# Patient Record
Sex: FEMALE | Race: BLACK OR AFRICAN AMERICAN | Employment: FULL TIME | URBAN - METROPOLITAN AREA
[De-identification: names, ages, dates, MRNs, and addresses within clinical notes are randomized per-mention and may not be internally consistent; named-entity substitution may affect disease eponyms.]

---

## 2020-11-12 DIAGNOSIS — M25.562 LEFT KNEE PAIN, UNSPECIFIED CHRONICITY: Primary | ICD-10-CM

## 2020-11-13 ENCOUNTER — OFFICE VISIT (OUTPATIENT)
Dept: ORTHOPEDIC SURGERY | Age: 60
End: 2020-11-13
Payer: COMMERCIAL

## 2020-11-13 VITALS — WEIGHT: 181 LBS | HEIGHT: 60 IN | BODY MASS INDEX: 35.53 KG/M2

## 2020-11-13 DIAGNOSIS — M25.562 LEFT KNEE PAIN, UNSPECIFIED CHRONICITY: Primary | ICD-10-CM

## 2020-11-13 PROCEDURE — 99203 OFFICE O/P NEW LOW 30 MIN: CPT | Performed by: ORTHOPAEDIC SURGERY

## 2020-11-13 RX ORDER — LANOLIN ALCOHOL/MO/W.PET/CERES
1 CREAM (GRAM) TOPICAL
COMMUNITY

## 2020-11-13 RX ORDER — ROSUVASTATIN CALCIUM 10 MG/1
10 TABLET, COATED ORAL
COMMUNITY

## 2020-11-13 RX ORDER — METFORMIN HYDROCHLORIDE 500 MG/1
TABLET ORAL 2 TIMES DAILY WITH MEALS
COMMUNITY

## 2020-11-13 RX ORDER — TRIAMTERENE AND HYDROCHLOROTHIAZIDE 37.5; 25 MG/1; MG/1
CAPSULE ORAL DAILY
COMMUNITY

## 2020-11-13 RX ORDER — PANTOPRAZOLE SODIUM 40 MG/1
40 TABLET, DELAYED RELEASE ORAL DAILY
COMMUNITY

## 2020-11-13 NOTE — PATIENT INSTRUCTIONS
Knee Arthritis: Care Instructions Your Care Instructions Knee arthritis is a breakdown of the cartilage that cushions your knee joint. When the cartilage wears down, your bones rub against each other. This causes pain and stiffness. Knee arthritis tends to get worse with time. Treatment for knee arthritis involves reducing pain, making the leg muscles stronger, and staying at a healthy body weight. The treatment usually does not improve the health of the cartilage, but it can reduce pain and improve how well your knee works. You can take simple measures to protect your knee joints, ease your pain, and help you stay active. Follow-up care is a key part of your treatment and safety. Be sure to make and go to all appointments, and call your doctor if you are having problems. It's also a good idea to know your test results and keep a list of the medicines you take. How can you care for yourself at home? · Know that knee arthritis will cause more pain on some days than on others. · Stay at a healthy weight. Lose weight if you are overweight. When you stand up, the pressure on your knees from every pound of body weight is multiplied four times. So if you lose 10 pounds, you will reduce the pressure on your knees by 40 pounds. · Talk to your doctor or physical therapist about exercises that will help ease joint pain. ? Stretch to help prevent stiffness and to prevent injury before you exercise. You may enjoy gentle forms of yoga to help keep your knee joints and muscles flexible. ? Walk instead of jog. 
? Ride a bike. This makes your thigh muscles stronger and takes pressure off your knee. ? Wear well-fitting and comfortable shoes. ? Exercise in chest-deep water. This can help you exercise longer with less pain. ? Avoid exercises that include squatting or kneeling. They can put a lot of strain on your knees.  
? Talk to your doctor to make sure that the exercise you do is not making the arthritis worse. · Do not sit for long periods of time. Try to walk once in a while to keep your knee from getting stiff. · Ask your doctor or physical therapist whether shoe inserts may reduce your arthritis pain. · If you can afford it, get new athletic shoes at least every year. This can help reduce the strain on your knees. · Use a device to help you do everyday activities. ? A cane or walking stick can help you keep your balance when you walk. Hold the cane or walking stick in the hand opposite the painful knee. ? If you feel like you may fall when you walk, try using crutches or a front-wheeled walker. These can prevent falls that could cause more damage to your knee. ? A knee brace may help keep your knee stable and prevent pain. ? You also can use other things to make life easier, such as a higher toilet seat and handrails in the bathtub or shower. · Take pain medicines exactly as directed. ? Do not wait until you are in severe pain. You will get better results if you take it sooner. ? If you are not taking a prescription pain medicine, take an over-the-counter medicine such as acetaminophen (Tylenol), ibuprofen (Advil, Motrin), or naproxen (Aleve). Read and follow all instructions on the label. ? Do not take two or more pain medicines at the same time unless the doctor told you to. Many pain medicines have acetaminophen, which is Tylenol. Too much acetaminophen (Tylenol) can be harmful. ? Tell your doctor if you take a blood thinner, have diabetes, or have allergies to shellfish. · Ask your doctor if you might benefit from a shot of steroid medicine into your knee. This may provide pain relief for several months. · Many people take the supplements glucosamine and chondroitin for osteoarthritis. Some people feel they help, but the medical research does not show that they work. Talk to your doctor before you take these supplements. When should you call for help? Call your doctor now or seek immediate medical care if: 
  · You have sudden swelling, warmth, or pain in your knee.  
  · You have knee pain and a fever or rash.  
  · You have such bad pain that you cannot use your knee. Watch closely for changes in your health, and be sure to contact your doctor if you have any problems. Where can you learn more? Go to http://guzman-guillermo.info/ Enter F767 in the search box to learn more about \"Knee Arthritis: Care Instructions. \" Current as of: December 9, 2019               Content Version: 12.6 © 2073-1644 [employer]. Care instructions adapted under license by [employer] (which disclaims liability or warranty for this information). If you have questions about a medical condition or this instruction, always ask your healthcare professional. [address] any warranty or liability for your use of this information.

## 2020-11-13 NOTE — PROGRESS NOTES
Name: Jennifer Pisano    : 1960     Service Dept: [address] [facility]    Patient's Pharmacies:    [facility], [facility]  [address]  [address] [place] [address]  Phone: [phone] Fax: [phone]       Chief Complaint   Patient presents with    Knee Pain        Visit Vitals  Ht 5' (1.524 m)   Wt 181 lb (82.1 kg)   BMI 35.35 kg/m²        No Known Allergies     Current Outpatient Medications   Medication Sig Dispense Refill    metFORMIN (GLUCOPHAGE) 500 mg tablet Take  by mouth two (2) times daily (with meals).  rosuvastatin (CRESTOR) 10 mg tablet Take 10 mg by mouth nightly.  calcium citrate-vitamin d3 (CITRACAL+D) 315 mg-5 mcg (200 unit) tab Take 1 Tab by mouth daily (with breakfast).  pantoprazole (PROTONIX) 40 mg tablet Take 40 mg by mouth daily.  triamterene-hydroCHLOROthiazide (DYAZIDE) 37.5-25 mg per capsule Take  by mouth daily. There is no problem list on file for this patient. No family history on file. Social History     Socioeconomic History    Marital status:      Spouse name: Not on file    Number of children: Not on file    Years of education: Not on file    Highest education level: Not on file        No past surgical history on file. No past medical history on file. I have reviewed and agree with [address] [place] [address] Nw and ROS and intake form in chart and the record. Review of Systems:   Patient is a pleasant appearing individual, appropriately dressed, well hydrated, well nourished, who is alert, appropriately oriented for age, and in no acute distress with a normal gait and normal affect who does not appear to be in any significant pain.        Physical Exam:  Left Knee - Slight decrease range of motion, Grossly neurovascularly intact, Good cap refill, No skin lesions, Mild swelling, No gross instability, Some Quadriceps weakness, Positive medial joint line tenderness, Positive Richard's exam    Right Knee - Normal range of motion, Grossly neurovascularly intact, Good cap refill, No skin lesions, No swelling, No gross instability, No weakness, No medial joint line tenderness, Negative Richard's exam         Encounter Diagnoses     ICD-10-CM ICD-9-CM   1. Left knee pain, unspecified chronicity  M25.562 719.46          HPI:  The patient is here with a chief complaint of left knee pain. She has had a cortisone injection about a month ago and tried ibuprofen with no relief. It has been the same. Nothing has helped. Pain is 8/10. ROS:  10-point review of systems is positive for joint swelling and nighttime pain. X-rays are unremarkable. Assessment/Plan:  1. Left knee pain that is not resolved post cortisone injection. Plan at this point, I would like to go and get an MRI to rule out a meniscus tear. I will see the patient post-MRI. If it shows more arthritis, we may talk to the patient about other options and go from there including knee replacement, but again we would like to get the MRI first.         Return to Office: Follow-up and Dispositions    · Return for POST MRI. Scribed by Michael Simmons as dictated by Marj Nicolas. Javi Herrera MD.    Documentation True and Accepted Adolfo Herrera MD

## 2020-11-17 DIAGNOSIS — M25.562 LEFT KNEE PAIN, UNSPECIFIED CHRONICITY: ICD-10-CM

## 2020-11-30 ENCOUNTER — TELEPHONE (OUTPATIENT)
Dept: ORTHOPEDIC SURGERY | Age: 60
End: 2020-11-30

## 2020-11-30 DIAGNOSIS — M25.562 LEFT KNEE PAIN, UNSPECIFIED CHRONICITY: Primary | ICD-10-CM

## 2020-11-30 RX ORDER — DIAZEPAM 2 MG/1
2 TABLET ORAL AS NEEDED
Qty: 2 TAB | Refills: 0 | Status: SHIPPED | OUTPATIENT
Start: 2020-11-30

## 2020-11-30 NOTE — TELEPHONE ENCOUNTER
SHE IS GOING IN THIS Wednesday TO HAVE MRI DONE ON LT KNEE AND WOULD LIKE SOMETHING TO KEEP HER CALM  DURING MRI

## 2020-12-02 ENCOUNTER — HOSPITAL ENCOUNTER (OUTPATIENT)
Age: 60
Discharge: HOME | End: 2020-12-02
Payer: COMMERCIAL

## 2020-12-02 DIAGNOSIS — M25.562 LEFT KNEE PAIN, UNSPECIFIED CHRONICITY: ICD-10-CM

## 2020-12-02 PROCEDURE — 73721 MRI JNT OF LWR EXTRE W/O DYE: CPT

## 2020-12-04 ENCOUNTER — OFFICE VISIT (OUTPATIENT)
Dept: ORTHOPEDIC SURGERY | Age: 60
End: 2020-12-04
Payer: COMMERCIAL

## 2020-12-04 DIAGNOSIS — M25.562 LEFT KNEE PAIN, UNSPECIFIED CHRONICITY: Primary | ICD-10-CM

## 2020-12-04 DIAGNOSIS — M25.562 LEFT KNEE PAIN, UNSPECIFIED CHRONICITY: ICD-10-CM

## 2020-12-04 DIAGNOSIS — S83.222A PERIPHERAL TEAR OF MEDIAL MENISCUS OF LEFT KNEE AS CURRENT INJURY, INITIAL ENCOUNTER: ICD-10-CM

## 2020-12-04 PROCEDURE — 99214 OFFICE O/P EST MOD 30 MIN: CPT | Performed by: ORTHOPAEDIC SURGERY

## 2020-12-04 NOTE — PROGRESS NOTES
Name: Austin Duran    : 1960     Service Dept: [address] [facility]    Patient's Pharmacies:    Josh [facility]ellank[facility] [address] [facility], [address] [place] [address]. [address]  [facility] [[email or web address]][address] [place] [address]  Phone: [phone] Fax: [phone]       Chief Complaint   Patient presents with    Knee Pain        There were no vitals taken for this visit. No Known Allergies   Current Outpatient Medications   Medication Sig Dispense Refill    diazePAM (VALIUM) 2 mg tablet Take 1 Tab by mouth as needed for Anxiety (take 1 tab prior to exam). Max Daily Amount: 192 mg. 2 Tab 0    metFORMIN (GLUCOPHAGE) 500 mg tablet Take  by mouth two (2) times daily (with meals).  rosuvastatin (CRESTOR) 10 mg tablet Take 10 mg by mouth nightly.  calcium citrate-vitamin d3 (CITRACAL+D) 315 mg-5 mcg (200 unit) tab Take 1 Tab by mouth daily (with breakfast).  pantoprazole (PROTONIX) 40 mg tablet Take 40 mg by mouth daily.  triamterene-hydroCHLOROthiazide (DYAZIDE) 37.5-25 mg per capsule Take  by mouth daily. There is no problem list on file for this patient. Family History   Problem Relation Age of Onset    Cancer Mother       Social History     Socioeconomic History    Marital status:      Spouse name: Not on file    Number of children: Not on file    Years of education: Not on file    Highest education level: Not on file   Tobacco Use    Smoking status: Former Smoker    Smokeless tobacco: Never Used   Substance and Sexual Activity    Alcohol use: Not Currently      History reviewed. No pertinent surgical history. Past Medical History:   Diagnosis Date    Acid reflux     Hypertension         I have reviewed and agree with PFSH and ROS and intake form in chart and the record.      Review of Systems:   Patient is a pleasant appearing individual, appropriately dressed, well hydrated, well nourished, who is alert, appropriately oriented for age, and in no acute distress with a normal gait and normal affect who does not appear to be in any significant pain. Physical Exam:  Left Knee - Slight decrease range of motion, Grossly neurovascularly intact, Good cap refill, No skin lesions, Mild swelling, No gross instability, Some Quadriceps weakness, Positive medial joint line tenderness, Positive Richard's exam    Right Knee - Normal range of motion, Grossly neurovascularly intact, Good cap refill, No skin lesions, No swelling, No gross instability, No weakness, No medial joint line tenderness, Negative Richard's exam.     Encounter Diagnoses     ICD-10-CM ICD-9-CM   1. Left knee pain, unspecified chronicity  M25.562 719.46   2. Peripheral tear of medial meniscus of left knee as current injury, initial encounter  S83.222A 836.0       HPI:  The patient is here with a chief complaint of left knee pain, sharp throbbing pain. It is a lot better. Nothing has helped. Using it makes it worse. Pain is 6/10. MRI is positive for medial meniscus tear with a flipped fragment and some arthritic changes. ROS:  10-point review of systems is positive for joint swelling and nighttime pain. X-rays are unremarkable. Assessment/Plan:  Plan at this point would be for left knee arthroscopy, meniscectomy, and synovectomy. We are going to proceed once she gets set up for surgery in January with basic blood work and EKG. No medical clearance required. Outpatient surgery. Return to Office: Follow-up and Dispositions    · Return for [place] [address]. Scribed by Theodora Gosselin as dictated by [facility] ROSARIO Hernandez Ala, MD.  Documentation True and Accepted Adolfo ROSARIO Hernandez Ala, MD

## 2020-12-04 NOTE — PATIENT INSTRUCTIONS
Knee Pain or Injury: Care Instructions  Your Care Instructions     Injuries are a common cause of knee problems. Sudden (acute) injuries may be caused by a direct blow to the knee. They can also be caused by abnormal twisting, bending, or falling on the knee. Pain, bruising, or swelling may be severe, and may start within minutes of the injury. Overuse is another cause of knee pain. Other causes are climbing stairs, kneeling, and other activities that use the knee. Everyday wear and tear, especially as you get older, also can cause knee pain. Rest, along with home treatment, often relieves pain and allows your knee to heal. If you have a serious knee injury, you may need tests and treatment. Follow-up care is a key part of your treatment and safety. Be sure to make and go to all appointments, and call your doctor if you are having problems. It's also a good idea to know your test results and keep a list of the medicines you take. How can you care for yourself at home? · Be safe with medicines. Read and follow all instructions on the label. ? If the doctor gave you a prescription medicine for pain, take it as prescribed. ? If you are not taking a prescription pain medicine, ask your doctor if you can take an over-the-counter medicine. · Rest and protect your knee. Take a break from any activity that may cause pain. · Put ice or a cold pack on your knee for 10 to 20 minutes at a time. Put a thin cloth between the ice and your skin. · Prop up a sore knee on a pillow when you ice it or anytime you sit or lie down for the next 3 days. Try to keep it above the level of your heart. This will help reduce swelling. · If your knee is not swollen, you can put moist heat, a heating pad, or a warm cloth on your knee. · If your doctor recommends an elastic bandage, sleeve, or other type of support for your knee, wear it as directed.   · Follow your doctor's instructions about how much weight you can put on your leg. Use a cane, crutches, or a walker as instructed. · Follow your doctor's instructions about activity during your healing process. If you can do mild exercise, slowly increase your activity. · Reach and stay at a healthy weight. Extra weight can strain the joints, especially the knees and hips, and make the pain worse. Losing even a few pounds may help. When should you call for help? Call 911 anytime you think you may need emergency care. For example, call if:    · You have symptoms of a blood clot in your lung (called a pulmonary embolism). These may include:  ? Sudden chest pain. ? Trouble breathing. ? Coughing up blood. Call your doctor now or seek immediate medical care if:    · You have severe or increasing pain.     · Your leg or foot turns cold or changes color.     · You cannot stand or put weight on your knee.     · Your knee looks twisted or bent out of shape.     · You cannot move your knee.     · You have signs of infection, such as:  ? Increased pain, swelling, warmth, or redness. ? Red streaks leading from the knee. ? Pus draining from a place on your knee. ? A fever.     · You have signs of a blood clot in your leg (called a deep vein thrombosis), such as:  ? Pain in your calf, back of the knee, thigh, or groin. ? Redness and swelling in your leg or groin. Watch closely for changes in your health, and be sure to contact your doctor if:    · You have tingling, weakness, or numbness in your knee.     · You have any new symptoms, such as swelling.     · You have bruises from a knee injury that last longer than 2 weeks.     · You do not get better as expected. Where can you learn more? Go to http://www.gray.com/  Enter K195 in the search box to learn more about \"Knee Pain or Injury: Care Instructions. \"  Current as of: June 26, 2019               Content Version: 12.6  © 8825-7541 [employer].    Care instructions adapted under license by [employer] (which disclaims liability or warranty for this information). If you have questions about a medical condition or this instruction, always ask your healthcare professional. [address] any warranty or liability for your use of this information.

## 2020-12-04 NOTE — LETTER
NOTIFICATION RETURN TO WORK / SCHOOL 
 
12/4/2020 3:37 PM 
 
Ms. Shai Segovia [facility] [address] [id number] [place] [address] To Whom It May Concern: 
 
Sahi Segovia is currently under the care of [address] [facility]. She was seen in our office today 12/4/2020, please excuse her absence for that day. If there are questions or concerns please have the patient contact our office.  
 
 
 
Sincerely, 
 
 
El Barillas MD

## 2020-12-04 NOTE — LETTER
NOTIFICATION RETURN TO WORK / SCHOOL 
 
12/4/2020 3:41 PM 
 
Ms. Tyrone Cortez [phone] [address] [address] To Whom It May Concern: 
 
Tyrone Cooley is currently under the care of [address] [facility]. She will return to work/school on: please allow her to sit as needed. ...limited standing If there are questions or concerns please have the patient contact our office.  
 
 
 
Sincerely, 
 
 
Dennise Kearns MD

## 2021-03-17 DIAGNOSIS — M25.562 LEFT KNEE PAIN, UNSPECIFIED CHRONICITY: ICD-10-CM

## 2021-03-25 NOTE — PATIENT INSTRUCTIONS
Knee Pain or Injury: Care Instructions Your Care Instructions Injuries are a common cause of knee problems. Sudden (acute) injuries may be caused by a direct blow to the knee. They can also be caused by abnormal twisting, bending, or falling on the knee. Pain, bruising, or swelling may be severe, and may start within minutes of the injury. Overuse is another cause of knee pain. Other causes are climbing stairs, kneeling, and other activities that use the knee. Everyday wear and tear, especially as you get older, also can cause knee pain. Rest, along with home treatment, often relieves pain and allows your knee to heal. If you have a serious knee injury, you may need tests and treatment. Follow-up care is a key part of your treatment and safety. Be sure to make and go to all appointments, and call your doctor if you are having problems. It's also a good idea to know your test results and keep a list of the medicines you take. How can you care for yourself at home? · Be safe with medicines. Read and follow all instructions on the label. ? If the doctor gave you a prescription medicine for pain, take it as prescribed. ? If you are not taking a prescription pain medicine, ask your doctor if you can take an over-the-counter medicine. · Rest and protect your knee. Take a break from any activity that may cause pain. · Put ice or a cold pack on your knee for 10 to 20 minutes at a time. Put a thin cloth between the ice and your skin. · Prop up a sore knee on a pillow when you ice it or anytime you sit or lie down for the next 3 days. Try to keep it above the level of your heart. This will help reduce swelling. · If your knee is not swollen, you can put moist heat, a heating pad, or a warm cloth on your knee. · If your doctor recommends an elastic bandage, sleeve, or other type of support for your knee, wear it as directed.  
· Follow your doctor's instructions about how much weight you can put on your Spoke to pharmacist and informed them that the prescription is OK to use in ear per Dr. Rogel's nurse, Leia.    Jewels Degroot, EMT   leg. Use a cane, crutches, or a walker as instructed. · Follow your doctor's instructions about activity during your healing process. If you can do mild exercise, slowly increase your activity. · Reach and stay at a healthy weight. Extra weight can strain the joints, especially the knees and hips, and make the pain worse. Losing even a few pounds may help. When should you call for help? Call 911 anytime you think you may need emergency care. For example, call if: 
  · You have symptoms of a blood clot in your lung (called a pulmonary embolism). These may include: 
? Sudden chest pain. ? Trouble breathing. ? Coughing up blood. Call your doctor now or seek immediate medical care if: 
  · You have severe or increasing pain.  
  · Your leg or foot turns cold or changes color.  
  · You cannot stand or put weight on your knee.  
  · Your knee looks twisted or bent out of shape.  
  · You cannot move your knee.  
  · You have signs of infection, such as: 
? Increased pain, swelling, warmth, or redness. ? Red streaks leading from the knee. ? Pus draining from a place on your knee. ? A fever.  
  · You have signs of a blood clot in your leg (called a deep vein thrombosis), such as: 
? Pain in your calf, back of the knee, thigh, or groin. ? Redness and swelling in your leg or groin. Watch closely for changes in your health, and be sure to contact your doctor if: 
  · You have tingling, weakness, or numbness in your knee.  
  · You have any new symptoms, such as swelling.  
  · You have bruises from a knee injury that last longer than 2 weeks.  
  · You do not get better as expected. Where can you learn more? Go to http://www.gray.com/ Enter K195 in the search box to learn more about \"Knee Pain or Injury: Care Instructions. \" Current as of: June 26, 2019               Content Version: 12.6 © 6192-0596 [employer].   
Care instructions adapted under license by [employer] (which disclaims liability or warranty for this information). If you have questions about a medical condition or this instruction, always ask your healthcare professional. [address] any warranty or liability for your use of this information.

## 2021-03-26 ENCOUNTER — OFFICE VISIT (OUTPATIENT)
Dept: ORTHOPEDIC SURGERY | Age: 61
End: 2021-03-26
Payer: COMMERCIAL

## 2021-03-26 DIAGNOSIS — M25.562 LEFT KNEE PAIN, UNSPECIFIED CHRONICITY: Primary | ICD-10-CM

## 2021-03-26 PROCEDURE — 99214 OFFICE O/P EST MOD 30 MIN: CPT | Performed by: ORTHOPAEDIC SURGERY

## 2021-03-26 RX ORDER — TRAMADOL HYDROCHLORIDE 50 MG/1
50 TABLET ORAL
Qty: 30 TAB | Refills: 0 | Status: CANCELLED | OUTPATIENT
Start: 2021-03-26 | End: 2021-04-09

## 2021-03-26 NOTE — PROGRESS NOTES
Name: Nii Menendez    : 1960     Service Dept: [facility]    Patient's Pharmacies:    [facility] [address] [facility], [address] [place] [address]  [address]  [address] [place] [address]  Phone: [phone] Fax: [phone]       Chief Complaint   Patient presents with    Pre-op Exam        There were no vitals taken for this visit. No Known Allergies   Current Outpatient Medications   Medication Sig Dispense Refill    diazePAM (VALIUM) 2 mg tablet Take 1 Tab by mouth as needed for Anxiety (take 1 tab prior to exam). Max Daily Amount: 192 mg. 2 Tab 0    metFORMIN (GLUCOPHAGE) 500 mg tablet Take  by mouth two (2) times daily (with meals).  rosuvastatin (CRESTOR) 10 mg tablet Take 10 mg by mouth nightly.  calcium citrate-vitamin d3 (CITRACAL+D) 315 mg-5 mcg (200 unit) tab Take 1 Tab by mouth daily (with breakfast).  pantoprazole (PROTONIX) 40 mg tablet Take 40 mg by mouth daily.  triamterene-hydroCHLOROthiazide (DYAZIDE) 37.5-25 mg per capsule Take  by mouth daily. There is no problem list on file for this patient. Family History   Problem Relation Age of Onset    Cancer Mother       Social History     Socioeconomic History    Marital status:      Spouse name: Not on file    Number of children: Not on file    Years of education: Not on file    Highest education level: Not on file   Tobacco Use    Smoking status: Former Smoker    Smokeless tobacco: Never Used   Substance and Sexual Activity    Alcohol use: Not Currently      History reviewed. No pertinent surgical history. Past Medical History:   Diagnosis Date    Acid reflux     Hypertension         I have reviewed and agree with PFSH and ROS and intake form in chart and the record furthermore I have reviewed prior medical record(s) regarding this patients care during this appointment.      Review of Systems:   Patient is a pleasant appearing individual, appropriately dressed, well hydrated, well nourished, who is alert, appropriately oriented for age, and in no acute distress with a normal gait and normal affect who does not appear to be in any significant pain. Physical Exam:  Left Knee - Slight decrease range of motion, Grossly neurovascularly intact, Good cap refill, No skin lesions, Mild swelling, No gross instability, Some Quadriceps weakness, Positive medial joint line tenderness, Positive Richard's exam    Right Knee - Normal range of motion, Grossly neurovascularly intact, Good cap refill, No skin lesions, No swelling, No gross instability, No weakness, No medial joint line tenderness, Negative Richard's exam   Encounter Diagnoses     ICD-10-CM ICD-9-CM   1. Left knee pain, unspecified chronicity  M25.562 719.46       HPI:  The patient is here with a chief complaint of left knee pain, diagnosed with meniscus tear. It has been the same. Pain is 10/10. ROS:  10-point review of systems is unremarkable. Assessment/Plan:  Plan will be for left knee arthroscopy, meniscectomy and synovectomy at [facility]. As part of continued conservative pain management options the patient was advised to utilize Tylenol or OTC NSAIDS as long as it is not medically contraindicated. Return to Office: Follow-up and Dispositions    · Return for already scheduled for surgery. Scribed by Jessica Lund LPN as dictated by [facility] ROSARIO Rhodes MD.  Documentation True and Accepted Adolfo ROSARIO Rhodes MD

## 2021-03-29 ENCOUNTER — TELEPHONE (OUTPATIENT)
Dept: ORTHOPEDIC SURGERY | Age: 61
End: 2021-03-29

## 2021-03-29 DIAGNOSIS — M25.562 LEFT KNEE PAIN, UNSPECIFIED CHRONICITY: Primary | ICD-10-CM

## 2021-03-29 RX ORDER — HYDROMORPHONE HYDROCHLORIDE 2 MG/1
2 TABLET ORAL
Qty: 30 TAB | Refills: 0 | Status: SHIPPED | OUTPATIENT
Start: 2021-03-29 | End: 2021-03-29 | Stop reason: SDUPTHER

## 2021-03-29 RX ORDER — OXYCODONE AND ACETAMINOPHEN 5; 325 MG/1; MG/1
1 TABLET ORAL
Qty: 30 TAB | Refills: 0 | Status: SHIPPED | OUTPATIENT
Start: 2021-03-29 | End: 2021-04-12

## 2021-03-29 RX ORDER — ONDANSETRON 4 MG/1
4 TABLET, ORALLY DISINTEGRATING ORAL AS NEEDED
Qty: 10 TAB | Refills: 0 | Status: SHIPPED | OUTPATIENT
Start: 2021-03-29

## 2021-03-29 RX ORDER — HYDROMORPHONE HYDROCHLORIDE 2 MG/1
2 TABLET ORAL EVERY 8 HOURS
Qty: 30 TAB | Refills: 0 | Status: SHIPPED | OUTPATIENT
Start: 2021-03-29 | End: 2021-04-08

## 2021-03-29 NOTE — TELEPHONE ENCOUNTER
Lt knee scope coming up this Friday she was told that her RX would be sent this past Friday to her pharmacy ([facility] [address] in [place])   Also she was told that tramadol would be her pain rx but tramadol does not help her with pain.

## 2021-04-23 ENCOUNTER — OFFICE VISIT (OUTPATIENT)
Dept: ORTHOPEDIC SURGERY | Age: 61
End: 2021-04-23

## 2021-04-23 DIAGNOSIS — M17.12 PRIMARY OSTEOARTHRITIS OF LEFT KNEE: ICD-10-CM

## 2021-04-23 DIAGNOSIS — M25.562 LEFT KNEE PAIN, UNSPECIFIED CHRONICITY: Primary | ICD-10-CM

## 2021-04-23 NOTE — PROGRESS NOTES
Name: Cindy Quintero    : 1960     Service Dept: [address] and Sports Medicine    Patient's Pharmacies:    [facility] [address] [place] [address], [address] [place] [address]. [address]   [place] [address]  Phone: [phone] Fax: [phone]       Chief Complaint   Patient presents with    Knee Pain    Surgical Follow-up        There were no vitals taken for this visit. Allergies   Allergen Reactions    Azithromycin Unknown (comments)    Codeine Unknown (comments)    Macrobid [Nitrofurantoin Monohyd/M-Cryst] Unknown (comments)      Current Outpatient Medications   Medication Sig Dispense Refill    ondansetron (ZOFRAN ODT) 4 mg disintegrating tablet Take 1 Tab by mouth as needed for Nausea or Vomiting. 10 Tab 0    diazePAM (VALIUM) 2 mg tablet Take 1 Tab by mouth as needed for Anxiety (take 1 tab prior to exam). Max Daily Amount: 192 mg. 2 Tab 0    metFORMIN (GLUCOPHAGE) 500 mg tablet Take  by mouth two (2) times daily (with meals).  rosuvastatin (CRESTOR) 10 mg tablet Take 10 mg by mouth nightly.  calcium citrate-vitamin d3 (CITRACAL+D) 315 mg-5 mcg (200 unit) tab Take 1 Tab by mouth daily (with breakfast).  pantoprazole (PROTONIX) 40 mg tablet Take 40 mg by mouth daily.  triamterene-hydroCHLOROthiazide (DYAZIDE) 37.5-25 mg per capsule Take  by mouth daily. There is no problem list on file for this patient. Family History   Problem Relation Age of Onset    Cancer Mother       Social History     Socioeconomic History    Marital status:      Spouse name: Not on file    Number of children: Not on file    Years of education: Not on file    Highest education level: Not on file   Tobacco Use    Smoking status: Former Smoker    Smokeless tobacco: Never Used   Substance and Sexual Activity    Alcohol use: Not Currently      No past surgical history on file.    Past Medical History:   Diagnosis Date    Acid reflux     Hypertension         I have reviewed and agree with [address] [place] [address] Nw and ROS and intake form in chart and the record furthermore I have reviewed prior medical record(s) regarding this patients care during this appointment. Review of Systems:   Patient is a pleasant appearing individual, appropriately dressed, well hydrated, well nourished, who is alert, appropriately oriented for age, and in no acute distress with a normal gait and normal affect who does not appear to be in any significant pain. Physical Exam:  Left Knee -Decrease range of motion with flexion, Knee arc of greater than 50 degrees, Some crepitation, Grossly neurovascularly intact, Good cap refill, No skin lesion, Moderate swelling, No gross instability, Some quadriceps weakness, greater than 50 degree arc    Right Knee - Full Range of Motion, No crepitation, Grossly neurovascularly intact, Good cap refill, No skin lesion, No swelling, No gross instability, No quadriceps weakness   Encounter Diagnoses     ICD-10-CM ICD-9-CM   1. Left knee pain, unspecified chronicity  M25.562 719.46   2. Primary osteoarthritis of left knee  M17.12 715.16       HPI:  The patient is status post left knee arthroscopy, meniscectomy, and synovectomy. Doing well. Has no complaints. Little bit of soreness. Assessment/Plan:  Plan at this point, quad PT, return to work on 5/10/2021. We will see her back as needed. If she gets worse, she is to give me a call. As part of continued conservative pain management options the patient was advised to utilize Tylenol or OTC NSAIDS as long as it is not medically contraindicated. Return to Office: Follow-up and Dispositions    · Return for PRN. Scribed by Cristofer Sauceda as dictated by Elizabeth Rodriguez. Mirella Riddle MD.  Documentation True and Accepted Adolfo Riddle MD

## 2021-04-23 NOTE — PATIENT INSTRUCTIONS
Knee Arthritis: Care Instructions Your Care Instructions Knee arthritis is a breakdown of the cartilage that cushions your knee joint. When the cartilage wears down, your bones rub against each other. This causes pain and stiffness. Knee arthritis tends to get worse with time. Treatment for knee arthritis involves reducing pain, making the leg muscles stronger, and staying at a healthy body weight. The treatment usually does not improve the health of the cartilage, but it can reduce pain and improve how well your knee works. You can take simple measures to protect your knee joints, ease your pain, and help you stay active. Follow-up care is a key part of your treatment and safety. Be sure to make and go to all appointments, and call your doctor if you are having problems. It's also a good idea to know your test results and keep a list of the medicines you take. How can you care for yourself at home? · Know that knee arthritis will cause more pain on some days than on others. · Stay at a healthy weight. Lose weight if you are overweight. When you stand up, the pressure on your knees from every pound of body weight is multiplied four times. So if you lose 10 pounds, you will reduce the pressure on your knees by 40 pounds. · Talk to your doctor or physical therapist about exercises that will help ease joint pain. ? Stretch to help prevent stiffness and to prevent injury before you exercise. You may enjoy gentle forms of yoga to help keep your knee joints and muscles flexible. ? Walk instead of jog. 
? Ride a bike. This makes your thigh muscles stronger and takes pressure off your knee. ? Wear well-fitting and comfortable shoes. ? Exercise in chest-deep water. This can help you exercise longer with less pain. ? Avoid exercises that include squatting or kneeling. They can put a lot of strain on your knees.  
? Talk to your doctor to make sure that the exercise you do is not making the arthritis worse. 
· Do not sit for long periods of time. Try to walk once in a while to keep your knee from getting stiff. · Ask your doctor or physical therapist whether shoe inserts may reduce your arthritis pain. · If you can afford it, get new athletic shoes at least every year. This can help reduce the strain on your knees. · Use a device to help you do everyday activities. ? A cane or walking stick can help you keep your balance when you walk. Hold the cane or walking stick in the hand opposite the painful knee. ? If you feel like you may fall when you walk, try using crutches or a front-wheeled walker. These can prevent falls that could cause more damage to your knee. ? A knee brace may help keep your knee stable and prevent pain. ? You also can use other things to make life easier, such as a higher toilet seat and handrails in the bathtub or shower. · Take pain medicines exactly as directed. ? Do not wait until you are in severe pain. You will get better results if you take it sooner. ? If you are not taking a prescription pain medicine, take an over-the-counter medicine such as acetaminophen (Tylenol), ibuprofen (Advil, Motrin), or naproxen (Aleve). Read and follow all instructions on the label. ? Do not take two or more pain medicines at the same time unless the doctor told you to. Many pain medicines have acetaminophen, which is Tylenol. Too much acetaminophen (Tylenol) can be harmful. ? Tell your doctor if you take a blood thinner, have diabetes, or have allergies to shellfish. · Ask your doctor if you might benefit from a shot of steroid medicine into your knee. This may provide pain relief for several months. · Many people take the supplements glucosamine and chondroitin for osteoarthritis. Some people feel they help, but the medical research does not show that they work. Talk to your doctor before you take these supplements. When should you call for help?  
 Call your doctor now or seek immediate medical care if: 
  · You have sudden swelling, warmth, or pain in your knee.  
  · You have knee pain and a fever or rash.  
  · You have such bad pain that you cannot use your knee. Watch closely for changes in your health, and be sure to contact your doctor if you have any problems. Where can you learn more? Go to http://www.gray.com/ Enter A897 in the search box to learn more about \"Knee Arthritis: Care Instructions. \" Current as of: August 5, 2020               Content Version: 12.8 © 2006-2021 [employer]. Care instructions adapted under license by [employer] (which disclaims liability or warranty for this information). If you have questions about a medical condition or this instruction, always ask your healthcare professional. [address] any warranty or liability for your use of this information.

## 2021-04-23 NOTE — LETTER
NOTIFICATION RETURN TO WORK / SCHOOL 
 
4/23/2021 9:46 AM 
 
Ms. Jodee Nicholson [facility] [address] [address] [address] [place] [id number] To Whom It May Concern: 
 
Jodee Nicholson is currently under the care of [address] [place] [address]. She will return to work/school on: May 10, 2021 with No Restrictions If there are questions or concerns please have the patient contact our office.  
 
 
 
Sincerely, 
 
 
Alejandro Deng MD

## 2021-05-07 ENCOUNTER — HOSPITAL ENCOUNTER (OUTPATIENT)
Dept: PHYSICAL THERAPY | Age: 61
Discharge: HOME | End: 2021-05-07
Payer: COMMERCIAL

## 2021-05-07 PROCEDURE — 97162 PT EVAL MOD COMPLEX 30 MIN: CPT | Performed by: PHYSICAL THERAPIST

## 2021-05-07 PROCEDURE — 97110 THERAPEUTIC EXERCISES: CPT | Performed by: PHYSICAL THERAPIST

## 2021-05-07 NOTE — PROGRESS NOTES
PT DAILY TREATMENT NOTE/KNEE EVAL     Patient Name: Kapil Dumont  Date:2021  : 1960  [x]  Patient  Verified  Payor: Smitha Tam / Plan: [address] [place] [address] / Product Type: PPO /    In time:1:25  Out time:2:00  Total Treatment Time (min): 35  Visit #: 1 of 8    Medicare/BCBS Only   Total Timed Codes (min):  20 1:1 Treatment Time:  35     Treatment Area: Pain in left knee [M25.562]    SUBJECTIVE  Pain Level (0-10 scale): 7-8/10 now; 3/10 at best; 9/10 at worst.  [x]constant []intermittent [x]improving []worsening []no change since onset    Any medication changes, allergies to medications, adverse drug reactions, diagnosis change, or new procedure performed?: [x] No    [] Yes (see summary sheet for update)  Subjective functional status/changes:     PLOF: Works full time, her job requires a lot of walking. Independent self care. Limitations to PLOF: She notes she is independent with pain in the left knee. Does steps one at a time. Mechanism of Injury: Surgery on 2021 for torn \"cartilage\" in her left knee. Also had a Baker's Cyst.  Now with c/o pain. Current symptoms/Complaints: Pain is intermittent, present with WB. Better sitting. Pain with stair climbing, sometimes with getting in and out of a car. Pain at night will wake her up. Previous Treatment/Compliance: None. PMHx/Surgical Hx: arthroscopic surgery left knee  Work Hx: . Living Situation: Single level living. Pt Goals: \"so I won't have to drag it and it won't swell\"  Barriers: [x]pain []financial []time []transportation []other  Cognition: A & O x 3    Other:    OBJECTIVE/EXAMINATION  Domestic Life: Lives with her  and daughter. Activity/Recreational Limitations: Limited walking  Mobility: ambulates without any AD. Self Care: Independent. 15 min [x]Eval                  []Re-Eval       20 min Therapeutic Exercise:  [] See flow sheet :Emphasis on increasing AROM and LE strength.    Rationale: increase ROM and increase strength to improve the patients ability to increase functional ADLs.           With   [] TE   [] TA   [] neuro   [] other: Patient Education: [x] Review HEP    [] Progressed/Changed HEP based on:   [] positioning   [] body mechanics   [] transfers   [] heat/ice application    [] other:      Other Objective/Functional Measures:     Physical Therapy Evaluation - Knee    Posture: [] Varus    [] Valgus    [] Recurvatum        [] Tibial Torsion    [] Foot Supination    [] Foot Pronation    Describe:    Gait:  [x] Normal    [] Abnormal    [] Antalgic    [] NWB    Device:    Describe:    ROM / Strength  [] Unable to assess                  AROM                      PROM                   Strength (1-5)    Left Right Left Right Left Right   Hip Flexion          Extension          Abduction          Adduction         Knee Flexion 98 125   4 4    Extension 0 0   4 4   Ankle Plantarflexion          Dorsiflexion             Flexibility: [] Unable to assess at this time  Hamstrings:    (L) Tightness= [] WNL   [] Min   [x] Mod   [] Severe    (R) Tightness= [] WNL   [] Min   [x] Mod   [] Severe  Quadriceps:    (L) Tightness= [] WNL   [] Min   [x] Mod   [] Severe    (R) Tightness= [] WNL   [] Min   [x] Mod   [] Severe    Palpation:   Neg/Pos  Neg/Pos  Neg/Pos   Joint Line(Med/Lat) (+) Med Quad tendon (-) Patellar tendon (-)   Patella (-) Gastrocnemius (Med/Lat) (-) Pes Anserinus (-)   Popliteal Fossa (-) Hamstring tendons(Med/Lat) (-) MCL/LCL (-)     Optional Tests:  Patellar Positioning (Static)   [x]L []R Normal []L []R Lateral   []L []R Donnalee Cely      []L []R Medial   []L []R [place]    Patellar Tracking   [x]L []R Glide (Lat)   []L []R Tilt (Lat)     []L []R Glide (Med)  []L []R Tilt (Med)      []L []R Tile (Inf)     Patellar Mobility   []L []R Hypermobile [x]L []R Hypomobile         Girth Measurements:     Cm at midpatella       Left 42.0       Right  41.5         Lachmans  [x] Neg    [] Pos   Valgus@ 0 Degrees [] Neg    [x] Pos laxity   Valgus@ 30 Degrees [x] Neg    [] Pos Patellar Apprehension [] Neg    [] Pos  Varus@ 0 Degrees [x] Neg    [] Pos Calix's Compression [] Neg    [] Pos  Varus@ 30 Degrees [x] Neg    [] Pos Ely's Test  [] Neg    [] Pos  Patellar Compression [x] Neg    [] Pos Quincy's Test  [] Neg    [] Pos    Pain Level (0-10 scale) post treatment: 7    ASSESSMENT/Changes in Function: Patient with signs and symptoms consistent with left knee pain s/p left knee arthroscopic surgery for a partial menisectomy and synovectomy. She has decrease AROM and pain with activity. She has good strength and her gait is without deviation. There is tenderness at the medial joint line incision. Functionally, she notes she can do all of her normal activities with pain but she cannot climb stairs normally. Patient will continue to benefit from skilled PT services to modify and progress therapeutic interventions, address functional mobility deficits, address ROM deficits, address strength deficits, analyze and address soft tissue restrictions and analyze and cue movement patterns to attain remaining goals. [x]  See Plan of Care  []  See progress note/recertification  []  See Discharge Summary         Progress towards goals / Updated goals:  Short Term Goals: To be accomplished in 1 weeks:  1. Patient will become proficient in their HEP and will be compliant in performing that program.  Evaluation:   Patient given a written/illustrated HEP.     Long Term Goals: To be accomplished in 4 weeks:  1. Patient's pain level will be 2-4/10 with activity in order to improve patient's ability to perform normal ADLs. Evaluation:  3/10-9/10  2. Patient will demonstrate 0-125 degrees AROM left knee to increase ease of ADLs. Evaluation:  0-98 degrees  3. Patient will increase FOTO score to  71 to indicate increased functional mobility. Evaluation:  63  4.  Patient will ascend and descend steps with reciprocal pattern to increase ease of entry into home.   Evaluation:  Doing steps into her home one at a time.       PLAN  [x]  Upgrade activities as tolerated     [x]  Continue plan of care  []  Update interventions per flow sheet       []  Discharge due to:_  []  Other:_      Perla Trammell, PT 5/7/2021  10:28 AM

## 2021-05-07 NOTE — PROGRESS NOTES
[facility]              [address] [facility]        [place], [address] [place]   [phone] fax    Plan of Care/ Statement of Necessity for Physical Therapy Services  Patient name: Maite Hummel Start of Care: 2021   Referral source: Romario Esparza MD : 1960    Medical Diagnosis: Pain in left knee [M25.562]  Payor: [employer] / Plan: [address] [place] [address] / Product Type: PPO /  Onset Date:2021    Treatment Diagnosis: Left knee pain   Prior Hospitalization: see medical history Provider#: [id number]   Medications: Verified on Patient summary List    Comorbidities: Allergies, Arthritis, BMI 36.3, GI Disease, Headaches, HBP. Prior Level of Function: Works full time, her job requires a lot of walking. Independent self care. The Plan of Care and following information is based on the information from the initial evaluation. Assessment/ key information: Patient with signs and symptoms consistent with left knee pain s/p left knee arthroscopic surgery for a partial menisectomy and synovectomy. She has decrease AROM and pain with activity. She has good strength and her gait is without deviation. There is tenderness at the medial joint line incision. Functionally, she notes she can do all of her normal activities with pain but she cannot climb stairs normally. Patient will benefit from a program of skilled physical therapy to include therapeutic exercises to address strength deficits, therapeutic activities to improve functional mobility, neuromuscular reeducation to address balance, coordination and proprioception, manual therapy to address ROM and tissue extensibility and modalities as indicated. All questions were answered.     Evaluation Complexity History MEDIUM  Complexity : 1-2 comorbidities / personal factors will impact the outcome/ POC ; Examination MEDIUM Complexity : 3 Standardized tests and measures addressing body structure, function, activity limitation and / or participation in recreation  ;Presentation MEDIUM Complexity : Evolving with changing characteristics  ; Clinical Decision Making MEDIUM Complexity : FOTO score of 26-74  Overall Complexity Rating: MEDIUM  Problem List: pain affecting function and decrease ROM   Treatment Plan may include any combination of the following: Therapeutic exercise, Therapeutic activities, Neuromuscular re-education, Physical agent/modality, Gait/balance training and Manual therapy  Patient / Family readiness to learn indicated by: asking questions, trying to perform skills and interest  Persons(s) to be included in education: patient (P)  Barriers to Learning/Limitations: None  Patient Goal (s): so I won't have to drag it and it won't swell  Patient Self Reported Health Status: good  Rehabilitation Potential: good    Short Term Goals: To be accomplished in 1 weeks:  1. Patient will become proficient in their HEP and will be compliant in performing that program.  Evaluation:   Patient given a written/illustrated HEP. Long Term Goals: To be accomplished in 4 weeks:  1. Patient's pain level will be 2-4/10 with activity in order to improve patient's ability to perform normal ADLs. Evaluation:  3/10-9/10  2. Patient will demonstrate 0-125 degrees AROM left knee to increase ease of ADLs. Evaluation:  0-98 degrees  3. Patient will increase FOTO score to  71 to indicate increased functional mobility. Evaluation:  63  4. Patient will ascend and descend steps with reciprocal pattern to increase ease of entry into home. Evaluation:  Doing steps into her home one at a time. Frequency / Duration: Patient to be seen 2 times per week for 4 weeks.     Patient/ Caregiver education and instruction: Diagnosis, prognosis, exercises   [x]  Plan of care has been reviewed with ARGELIA Doll, PT 5/7/2021 10:25 AM  ________________________________________________________________________    I certify that the above Therapy Services are being furnished while the patient is under my care. I agree with the treatment plan and certify that this therapy is necessary.     [de-identified] Signature:____________Date:_________TIME:________     Scott Villarreal MD  ** Signature, Date and Time must be completed for valid certification **  Please sign and return to [facility]              [address] [facility], [address] [place]   [phone] fax

## 2021-05-20 ENCOUNTER — HOSPITAL ENCOUNTER (OUTPATIENT)
Dept: PHYSICAL THERAPY | Age: 61
Discharge: HOME | End: 2021-05-20
Payer: COMMERCIAL

## 2021-05-20 PROCEDURE — 97112 NEUROMUSCULAR REEDUCATION: CPT | Performed by: PHYSICAL THERAPIST

## 2021-05-20 PROCEDURE — 97110 THERAPEUTIC EXERCISES: CPT | Performed by: PHYSICAL THERAPIST

## 2021-05-20 PROCEDURE — 97140 MANUAL THERAPY 1/> REGIONS: CPT | Performed by: PHYSICAL THERAPIST

## 2021-05-21 ENCOUNTER — HOSPITAL ENCOUNTER (OUTPATIENT)
Dept: PHYSICAL THERAPY | Age: 61
Discharge: HOME | End: 2021-05-21
Payer: COMMERCIAL

## 2021-05-21 PROCEDURE — 97140 MANUAL THERAPY 1/> REGIONS: CPT

## 2021-05-21 PROCEDURE — 97110 THERAPEUTIC EXERCISES: CPT

## 2021-05-21 PROCEDURE — 97112 NEUROMUSCULAR REEDUCATION: CPT

## 2021-05-21 NOTE — PROGRESS NOTES
PT DAILY TREATMENT NOTE     Patient Name: David Brand  Date:2021  : 1960  [x]  Patient  Verified  Payor: Hassie Aase / Plan: [address] [place] [address] / Product Type: PPO /    In time:1:17  Out time:1:57  Total Treatment Time (min): 40  Visit #: 3 of 8    Medicare/BCBS Only   Total Timed Codes (min): 40 1:1 Treatment Time:  40       Treatment Area: Pain in left knee [M25.562]    SUBJECTIVE  Pain Level (0-10 scale): 5  Any medication changes, allergies to medications, adverse drug reactions, diagnosis change, or new procedure performed?: [x] No    [] Yes (see summary sheet for update)  Subjective functional status/changes:   [] No changes reported  Patient reports that her knee was a little swollen after yesterday therapy session. OBJECTIVE         17 min Therapeutic Exercise:  []? See flow sheet:  Emphasis on increasing AROM and LE strength. Rationale: increase ROM and increase strength to improve the patients ability to perform her normal ADLs.    15 min Neuromuscular Re-education:  []? See flow sheet :Emphasis on recruitment and activation of quads and gluteal muscles to improve LE proprioception and kinesthetic awareness. Rationale: improve coordination, improve balance and increase proprioception  to improve the patients ability to increase her functional activity level.     8 min Manual Therapy:    Supine- right Patellar glies; Supine- PROM left knee with light OP at end range. The manual therapy interventions were performed at a separate and distinct time from the therapeutic activities interventions. Rationale: increase ROM and increase tissue extensibility to increase ease of motion to improve function.                              With   [] TE   [] TA   [] neuro   [] other: Patient Education: [x] Review HEP    [] Progressed/Changed HEP based on:   [] positioning   [] body mechanics   [] transfers   [] heat/ice application    [] other:      Other Objective/Functional Measures: Patient has good quad control with step up. Pain Level (0-10 scale) post treatment:0    ASSESSMENT/Changes in Function: Continue to progress with quad strength and balance. Patient will continue to benefit from skilled PT services to modify and progress therapeutic interventions, address functional mobility deficits, address ROM deficits, address strength deficits and analyze and address soft tissue restrictions to attain remaining goals. []  See Plan of Care  []  See progress note/recertification  []  See Discharge Summary         Progress towards goals / Updated goals:  Short Term Goals: To be accomplished in 1 weeks:  1.  Patient will become proficient in their HEP and will be compliant in performing that program.  Evaluation:   Patient given a written/illustrated HEP. Current:  Doing her HEP every other day and is working extra on her job.  5/20/21. Progressing.     Long Term Goals: To be accomplished in 4 weeks:  1. Patient's pain level will be 2-4/10 with activity in order to improve patient's ability to perform normal ADLs. Evaluation:  3/10-9/10  Current:  0/10-8/10.  5/20/21. Progressing   2. Patient will demonstrate 0-125 degrees AROM left knee to increase ease of ADLs. Evaluation:  0-98 degrees  Current:  AROM 0-115 degrees. 3. Patient will increase FOTO score to  71 to indicate increased functional mobility. Evaluation:  63  4. Patient will ascend and descend steps with reciprocal pattern to increase ease of entry into home. Evaluation:  Doing steps into her home one at a time. Current:  Still doing steps one at a time. 5/20/2021. No change.     PLAN  []  Upgrade activities as tolerated     [x]  Continue plan of care  []  Update interventions per flow sheet       []  Discharge due to:_  []  Other:_      Jeferson Najera PTA 5/21/2021  1:20 PM    Future Appointments   Date Time Provider Inés Tian   5/25/2021  1:15 PM Laura Verdugo [place] [facility]   5/28/2021 1:15 PM Maribell Ware [facility]   6/1/2021  2:00 PM Venessa Mckeon PT [facility]

## 2021-05-25 ENCOUNTER — HOSPITAL ENCOUNTER (OUTPATIENT)
Dept: PHYSICAL THERAPY | Age: 61
Discharge: HOME | End: 2021-05-25
Payer: COMMERCIAL

## 2021-05-25 PROCEDURE — 97110 THERAPEUTIC EXERCISES: CPT

## 2021-05-25 PROCEDURE — 97112 NEUROMUSCULAR REEDUCATION: CPT

## 2021-05-25 NOTE — PROGRESS NOTES
PT DAILY TREATMENT NOTE     Patient Name: Poly Dates  Date:2021  : 1960  [x]  Patient  Verified  Payor: Romario Swann / Plan: [address] [place] [address] / Product Type: PPO /    In time:1:15  Out time:1:57  Total Treatment Time (min): 42  Visit #: 4 of 8    Medicare/BCBS Only   Total Timed Codes (min):  42 1:1 Treatment Time:  42       Treatment Area: Pain in left knee [M25.562]    SUBJECTIVE  Pain Level (0-10 scale): 0  Any medication changes, allergies to medications, adverse drug reactions, diagnosis change, or new procedure performed?: [x] No    [] Yes (see summary sheet for update)  Subjective functional status/changes:   [] No changes reported  Patient reports she went shopping form 12pm to 8 pm on Saturday with causing her left foot to swell. Patient soaked her foot and stayed off of it on  and the swelling went down along left LE. OBJECTIVE       27 min Therapeutic Exercise:  []? ? See flow sheet:  Emphasis on increasing AROM and LE strength. Rationale: increase ROM and increase strength to improve the patients ability to perform her normal ADLs.    15 min Neuromuscular Re-education:  []??  See flow sheet :Emphasis on recruitment and activation of quads and gluteal muscles to improve LE proprioception and kinesthetic awareness. Rationale: improve coordination, improve balance and increase proprioception  to improve the patients ability to increase her functional activity level. With   [] TE   [] TA   [] neuro   [] other: Patient Education: [x] Review HEP    [] Progressed/Changed HEP based on:   [] positioning   [] body mechanics   [] transfers   [] heat/ice application    [] other:      Other Objective/Functional Measures: left knee AROM 0-120 deg. Pain Level (0-10 scale) post treatment: 0    ASSESSMENT/Changes in Function: held on manual due to left knee mobility improving.  Patient is able to perform reciprocal gait with ascending/descending stairs without use of UE. Patient will continue to benefit from skilled PT services to modify and progress therapeutic interventions, address functional mobility deficits, address ROM deficits, address strength deficits and analyze and address soft tissue restrictions to attain remaining goals. []  See Plan of Care  []  See progress note/recertification  []  See Discharge Summary         Progress towards goals / Updated goals:  Short Term Goals: To be accomplished in 1 weeks:  1.  Patient will become proficient in their HEP and will be compliant in performing that program.  Evaluation:   Patient given a written/illustrated HEP. Current:  Doing her HEP every other day and is working extra on her job.  5/20/21. [address] be accomplished in 4 weeks:  1. Patient's pain level will be 2-4/10 with activity in order to improve patient's ability to perform normal ADLs. Evaluation:  3/10-9/10  Current:  0/10-8/10.  5/20/21. Progressing   2. Patient will demonstrate 0-125 degrees AROM left knee to increase ease of ADLs. Evaluation:  0-98 degrees  Current:  AROM 0-120 degrees. 5/25/21  3. Patient will increase FOTO score to  71 to indicate increased functional mobility. Evaluation:  63  4. Patient will ascend and descend steps with reciprocal pattern to increase ease of entry into home. Evaluation:  Doing steps into her home one at a time.   Current:  Still doing steps one at a time.  5/20/2021.  No change.       PLAN  []  Upgrade activities as tolerated     [x]  Continue plan of care  []  Update interventions per flow sheet       []  Discharge due to:_  []  Other:_      Campbell Pascual PTA 5/25/2021  1:20 PM    Future Appointments   Date Time Provider Inés Tian   5/28/2021  1:15 PM Earnest Ansari, PTA [facility]   6/1/2021  2:00 PM Juan Nathan, PT [facility]

## 2021-05-28 ENCOUNTER — HOSPITAL ENCOUNTER (OUTPATIENT)
Dept: PHYSICAL THERAPY | Age: 61
Discharge: HOME | End: 2021-05-28
Payer: COMMERCIAL

## 2021-05-28 PROCEDURE — 97112 NEUROMUSCULAR REEDUCATION: CPT

## 2021-05-28 PROCEDURE — 97110 THERAPEUTIC EXERCISES: CPT

## 2021-05-28 NOTE — PROGRESS NOTES
PT DAILY TREATMENT NOTE     Patient Name: Ronit Stark  Date:2021  : 1960  [x]  Patient  Verified  Payor: Bette Weston / Plan: [address] [place] [address] / Product Type: PPO /    In time:1:05  Out time:1:45  Total Treatment Time (min): 40  Visit #: 5 of 8    Medicare/BCBS Only   Total Timed Codes (min):  40 1:1 Treatment Time:  40       Treatment Area: Pain in left knee [M25.562]    SUBJECTIVE  Pain Level (0-10 scale):0  Any medication changes, allergies to medications, adverse drug reactions, diagnosis change, or new procedure performed?: [x] No    [] Yes (see summary sheet for update)  Subjective functional status/changes:   [] No changes reported  Patient reports that prolong standing aggravates her knee. OBJECTIVE     25 min Therapeutic Exercise:  []??? See flow sheet:  Emphasis on increasing AROM and LE strength. Rationale: increase ROM and increase strength to improve the patients ability to perform her normal ADLs.    15 min Neuromuscular Re-education:  []???  See flow sheet :Emphasis on recruitment and activation of quads and gluteal muscles to improve LE proprioception and kinesthetic awareness. Rationale: improve coordination, improve balance and increase proprioception  to improve the patients ability to increase her functional activity level. With   [] TE   [] TA   [] neuro   [] other: Patient Education: [x] Review HEP    [] Progressed/Changed HEP based on:   [] positioning   [] body mechanics   [] transfers   [] heat/ice application    [] other:      Other Objective/Functional Measures: pt is able to perform reciprocal gait to ascend/descend stairs. Pain Level (0-10 scale) post treatment: 0    ASSESSMENT/Changes in Function: Patient deferred performing shuttle press due to increase in back pain after. Patient is scheduled to get injections in her back next week. Patient plans on DC from therapy [place] and continue with HEP.      Patient will continue to benefit from skilled PT services to modify and progress therapeutic interventions, address functional mobility deficits, address ROM deficits, address strength deficits and analyze and address soft tissue restrictions to attain remaining goals. []  See Plan of Care  []  See progress note/recertification  []  See Discharge Summary         Progress towards goals / Updated goals:  Short Term Goals: To be accomplished in 1 weeks:  1.  Patient will become proficient in their HEP and will be compliant in performing that program.  Evaluation:   Patient given a written/illustrated HEP. Current:  Doing her HEP every other day and is working extra on her job.  5/20/21. [address] be accomplished in 4 weeks:  1. Patient's pain level will be 2-4/10 with activity in order to improve patient's ability to perform normal ADLs. Evaluation:  3/10-9/10  Current:  0/10-8/10.  5/20/21. Progressing   2. Patient will demonstrate 0-125 degrees AROM left knee to increase ease of ADLs. Evaluation:  0-98 degrees  Current:  AROM 0-120 degrees. 5/25/21  3. Patient will increase FOTO score to  71 to indicate increased functional mobility. Evaluation:  63  4. Patient will ascend and descend steps with reciprocal pattern to increase ease of entry into home. Evaluation:  Doing steps into her home one at a time. Current:  MET; pt is able to perform reciprocal gait to ascend/descend stairs.  5/28/21       PLAN  []  Upgrade activities as tolerated     [x]  Continue plan of care  []  Update interventions per flow sheet       []  Discharge due to:_  []  Other:_      Lesly Santoyo PTA 5/28/2021  1:08 PM    Future Appointments   Date Time Provider Inés Tian   5/28/2021  1:15 PM Shane Al PTA [facility]   6/1/2021  2:00 PM Channing Preston PT [facility]

## 2021-06-01 ENCOUNTER — HOSPITAL ENCOUNTER (OUTPATIENT)
Dept: PHYSICAL THERAPY | Age: 61
Discharge: HOME | End: 2021-06-01
Payer: COMMERCIAL

## 2021-06-01 PROCEDURE — 97110 THERAPEUTIC EXERCISES: CPT

## 2021-06-01 PROCEDURE — 97112 NEUROMUSCULAR REEDUCATION: CPT

## 2021-06-01 NOTE — PROGRESS NOTES
Physical Therapy Discharge Instructions      [facility]   [address]   [place], [address] [place]   [phone] fax    Patient: Jodeen Sandifer  : 1960      Continue Home Exercise Program 4-5 times per week      Continue with    [x] Ice  as needed              Follow up with MD:     [] Upon completion of therapy     [] As needed      Recommendations:     [x]   Return to activity with home program    []   Return to activity with the following modifications:       []Post Rehab Program    []Join Independent aquatic program     []Return to/join local gym        Additional Comments: Please continue with prescribed home exercise program with contact with physician as needed if symptoms re-occur.            Cresencio Sensor, PT 2021 2:03 PM

## 2021-06-01 NOTE — PROGRESS NOTES
PT DAILY TREATMENT NOTE     Patient Name: Jodee Nicholson  Date:2021  : 1960  [x]  Patient  Verified  Payor: Palomo Earing / Plan: [address] [place] [address] / Product Type: PPO /    In time:150  Out time:228  Total Treatment Time (min): 38  Visit #: 6 of 8    Medicare/BCBS Only   Total Timed Codes (min):  38 1:1 Treatment Time:  38       Treatment Area: Pain in left knee [M25.562]    SUBJECTIVE  Pain Level (0-10 scale): 0  Any medication changes, allergies to medications, adverse drug reactions, diagnosis change, or new procedure performed?: [x] No    [] Yes (see summary sheet for update)  Subjective functional status/changes:   [] No changes reported  Patient subjectively reports agreement with discharge today with patient denying functional limitations secondary to the left knee. OBJECTIVE    24 min Therapeutic Exercise:  [x] See flow sheet : Emphasis placed on improving available knee and LE AROM and strength   Rationale: increase ROM and increase strength to improve the patients ability to improve ease with ADLs    14 min Neuromuscular Re-education:  [x]  See flow sheet : Emphasis placed on improving activation and recruitment of the quadriceps musculature and improving LE proprioceptive and kinesthetic awareness   Rationale: increase ROM, increase strength, improve balance and increase proprioception  to improve the patients ability to decrease falls risk          With   [] TE   [] TA   [] neuro   [] other: Patient Education: [x] Review HEP    [] Progressed/Changed HEP based on:   [] positioning   [] body mechanics   [] transfers   [] heat/ice application    [] other:      Other Objective/Functional Measures: See goals below. Pain Level (0-10 scale) post treatment: 0    ASSESSMENT/Changes in Function: At this time patient to be discharged with continuation of prescribed HEP post-discharge.  Patient subjectively reports having met her functional goals post-surgery with patient denying any functional limitations with performance of recreational and functional ADLs. Patient denies restrictions in regards to ambulation tolerance and stair management with patient reporting the ability to squat and kneel without difficulty. Patient subjectively in agreement with plan of care and without questions. []  See Plan of Care  []  See progress note/recertification  [x]  See Discharge Summary         Progress towards goals / Updated goals:    Short Term Goals: To be accomplished in 1 weeks:  1.  Patient will become proficient in their HEP and will be compliant in performing that program.  Evaluation:   Patient given a written/illustrated HEP. Current: Progressing, Patient reports inconsistent compliance with prescribed HEP \"most of the days\", 6/1/2021     Long Term Goals: To be accomplished in 4 weeks:  1. Patient's pain level will be 2-4/10 with activity in order to improve patient's ability to perform normal ADLs. Evaluation:  3/10-9/10  Current: Progressing, Pain Level = 0/10 - 7/10, 6/1/2021  2. Patient will demonstrate 0-125 degrees AROM left knee to increase ease of ADLs. Evaluation:  0-98 degrees  Current: Met, Left Knee AROM = 0-125 degrees, 6/1/2021  3. Patient will increase FOTO score to  71 to indicate increased functional mobility. Evaluation:  63   Current: Met, FOTO = 99, 6/1/2021  4. Patient will ascend and descend steps with reciprocal pattern to increase ease of entry into home. Evaluation:  Doing steps into her home one at a time. Current: Met, Patient is able to perform reciprocal gait to ascend/descend stairs. 5/28/21    PLAN  []  Upgrade activities as tolerated     []  Continue plan of care  []  Update interventions per flow sheet       [x]  Discharge due to:_ Patient has met and/or is progressing towards created therapeutic goals. []  Other:_      Sada Capone, PT 6/1/2021  7:02 AM    No future appointments.

## 2021-06-01 NOTE — PROGRESS NOTES
[facility]              [address] [place], [address] [place]   [phone] fax    Discharge Summary  Patient name: Claudia Back Start of Care: 2021   Referral source: Bonita Willard MD : 1960   Medical/Treatment Diagnosis: Pain in left knee [M25.562]  Payor: [employer] / Plan: [address] [place] [address] / Product Type: PPO /  Onset Date:2021     Prior Hospitalization: see medical history Provider#: [id number]   Medications: Verified on Patient Summary List     Comorbidities: Allergies, Arthritis, BMI 36.3, GI Disease, Headaches, HBP. Prior Level of Function: Works full time, her job requires a lot of walking.  Independent self care. Visits from Start of Care: 6    Missed Visits: 0  Reporting Period : 2021 to 2021    Summary of Care:    Short Term Goals: To be accomplished in 1 weeks:  1.  Patient will become proficient in their HEP and will be compliant in performing that program.  Evaluation:   Patient given a written/illustrated HEP. Current: Progressing, Patient reports inconsistent compliance with prescribed HEP \"most of the days\"     Long Term Goals: To be accomplished in 4 weeks:  1. Patient's pain level will be 2-4/10 with activity in order to improve patient's ability to perform normal ADLs. Evaluation:  3/  Current: Progressing, Pain Level = 0/10 - 7/10  2. Patient will demonstrate 0-125 degrees AROM left knee to increase ease of ADLs. Evaluation:  0-98 degrees  Current: Met, Left Knee AROM = 0-125 degrees  3. Patient will increase FOTO score to  71 to indicate increased functional mobility. Evaluation:  63   Current: Met, FOTO = 99  4. Patient will ascend and descend steps with reciprocal pattern to increase ease of entry into home. Evaluation:  Doing steps into her home one at a time.   Current: Met, Patient is able to perform reciprocal gait to ascend/descend stairs    ASSESSMENT/RECOMMENDATIONS:    At this time patient to be discharged with continuation of prescribed HEP post-discharge. Patient subjectively reports having met her functional goals post-surgery with patient denying any functional limitations with performance of recreational and functional ADLs. Patient denies restrictions in regards to ambulation tolerance and stair management with patient reporting the ability to squat and kneel without difficulty. Patient subjectively in agreement with plan of care and without questions.     [x]Discontinue therapy: [x]Patient has reached or is progressing toward set goals      []Patient is non-compliant or has abdicated      []Due to lack of appreciable progress towards set goals    DEVAUGHN Villalta 6/1/2021 7:03 AM

## 2021-06-04 ENCOUNTER — APPOINTMENT (OUTPATIENT)
Dept: PHYSICAL THERAPY | Age: 61
End: 2021-06-04
Payer: COMMERCIAL

## 2023-01-11 ENCOUNTER — OFFICE VISIT (OUTPATIENT)
Dept: CARDIOLOGY CLINIC | Age: 63
End: 2023-01-11
Payer: COMMERCIAL

## 2023-01-11 VITALS
OXYGEN SATURATION: 99 % | HEIGHT: 60 IN | WEIGHT: 191 LBS | BODY MASS INDEX: 37.5 KG/M2 | DIASTOLIC BLOOD PRESSURE: 73 MMHG | HEART RATE: 72 BPM | SYSTOLIC BLOOD PRESSURE: 117 MMHG

## 2023-01-11 DIAGNOSIS — R07.9 CHEST PAIN, UNSPECIFIED TYPE: Primary | ICD-10-CM

## 2023-01-11 DIAGNOSIS — E78.5 HYPERLIPIDEMIA, UNSPECIFIED HYPERLIPIDEMIA TYPE: ICD-10-CM

## 2023-01-11 DIAGNOSIS — E11.9 TYPE 2 DIABETES MELLITUS WITHOUT COMPLICATION, WITHOUT LONG-TERM CURRENT USE OF INSULIN (HCC): ICD-10-CM

## 2023-01-11 DIAGNOSIS — I10 PRIMARY HYPERTENSION: ICD-10-CM

## 2023-01-11 PROCEDURE — 99204 OFFICE O/P NEW MOD 45 MIN: CPT | Performed by: INTERNAL MEDICINE

## 2023-01-11 PROCEDURE — 3074F SYST BP LT 130 MM HG: CPT | Performed by: INTERNAL MEDICINE

## 2023-01-11 PROCEDURE — 3078F DIAST BP <80 MM HG: CPT | Performed by: INTERNAL MEDICINE

## 2023-01-11 PROCEDURE — 93000 ELECTROCARDIOGRAM COMPLETE: CPT | Performed by: INTERNAL MEDICINE

## 2023-01-11 RX ORDER — FAMOTIDINE 20 MG/1
20 TABLET, FILM COATED ORAL DAILY
COMMUNITY

## 2023-01-11 NOTE — PROGRESS NOTES
HISTORY OF PRESENT ILLNESS  Wen Reddy is a 65 y.o. female. 1/11/2023  Patient seen today for new patient evaluation. She is referred here for evaluation of chest pain. She had a mammogram and after that she had bilateral breast pain. Subsequently has started having substernal pressure-like sensation on and off not related to activity exertion no radiation of pain. She has shortness of breath on heavy exertion. No orthopnea PND or peripheral edema. She has a history of hypertension diabetes and hyperlipidemia. No known prior cardiac history      Review of Systems   Constitutional:  Negative for chills and fever. HENT:  Negative for nosebleeds. Eyes:  Negative for blurred vision and double vision. Respiratory:  Negative for cough, hemoptysis, sputum production, shortness of breath and wheezing. Cardiovascular:  Positive for chest pain. Negative for palpitations, orthopnea, claudication, leg swelling and PND. Gastrointestinal:  Negative for abdominal pain, heartburn, nausea and vomiting. Musculoskeletal:  Negative for myalgias. Skin:  Negative for rash. Neurological:  Negative for dizziness, weakness and headaches. Endo/Heme/Allergies:  Does not bruise/bleed easily. Family History   Problem Relation Age of Onset    Cancer Mother        Past Medical History:   Diagnosis Date    Acid reflux     Hypertension        No past surgical history on file. Social History     Tobacco Use    Smoking status: Former    Smokeless tobacco: Never   Substance Use Topics    Alcohol use: Not Currently       Allergies   Allergen Reactions    Azithromycin Unknown (comments)    Codeine Unknown (comments)    Macrobid [Nitrofurantoin Monohyd/M-Cryst] Unknown (comments)       Prior to Admission medications    Medication Sig Start Date End Date Taking? Authorizing Provider   famotidine (PEPCID) 20 mg tablet Take 20 mg by mouth daily.    Yes Provider, Historical   metFORMIN (GLUCOPHAGE) 500 mg tablet Take  by mouth two (2) times daily (with meals). Yes Provider, Historical   rosuvastatin (CRESTOR) 5 mg tablet Take 5 mg by mouth nightly. Yes Provider, Historical   calcium citrate-vitamin d3 (CITRACAL+D) 315 mg-5 mcg (200 unit) tab Take 1 Tab by mouth daily (with breakfast). Yes Provider, Historical   pantoprazole (PROTONIX) 40 mg tablet Take 40 mg by mouth daily. Yes Provider, Historical   triamterene-hydroCHLOROthiazide (DYAZIDE) 37.5-25 mg per capsule Take  by mouth daily. Yes Provider, Historical   diazePAM (VALIUM) 2 mg tablet Take 1 Tab by mouth as needed for Anxiety (take 1 tab prior to exam). Max Daily Amount: 192 mg. 11/30/20   Bob Elena MD         Visit Vitals  /73 (BP 1 Location: Left upper arm, BP Patient Position: Sitting, BP Cuff Size: Adult)   Pulse 72   Ht 5' (1.524 m)   Wt 86.6 kg (191 lb)   SpO2 99%   BMI 37.30 kg/m²     Physical Exam  Constitutional:       Appearance: She is well-developed. HENT:      Head: Normocephalic and atraumatic. Eyes:      Conjunctiva/sclera: Conjunctivae normal.   Neck:      Thyroid: No thyromegaly. Vascular: No JVD. Trachea: No tracheal deviation. Cardiovascular:      Rate and Rhythm: Normal rate and regular rhythm. Chest Wall: PMI is not displaced. Pulses: No decreased pulses. Heart sounds: No murmur heard. No gallop. No S3 sounds. Pulmonary:      Effort: No respiratory distress. Breath sounds: No wheezing or rales. Chest:      Chest wall: No tenderness. Abdominal:      Palpations: Abdomen is soft. Tenderness: There is no abdominal tenderness. Musculoskeletal:      Cervical back: Neck supple. Skin:     General: Skin is warm. Neurological:      Mental Status: She is alert and oriented to person, place, and time. Ms. Jeanne Vyas has a reminder for a \"due or due soon\" health maintenance. I have asked that she contact her primary care provider for follow-up on this health maintenance.     No flowsheet data found. I have personally reviewed patient's records available from hospital and other providers and incorporated findings in patient care. I have personally reviewed patient's records available from hospital and other providers and incorporated findings in patient care. Provider note, lab, EKG  I Have personally reviewed recent relevant labs available and discussed with patient  12/2022-CMP, lipid, hemoglobin A1c, TSH  Assessment         ICD-10-CM ICD-9-CM    1. Chest pain, unspecified type  R07.9 786.50 AMB POC EKG ROUTINE W/ 12 LEADS, INTER & REP      EXERCISE CARDIAC STRESS TEST      CT HEART W/O CONT WITH CALCIUM    Atypical.  Possible musculoskeletal, chest wall, angina      2. Primary hypertension  I10 401.9 EXERCISE CARDIAC STRESS TEST      CT HEART W/O CONT WITH CALCIUM    Controlled continue treatment      3. Type 2 diabetes mellitus without complication, without long-term current use of insulin (HCC)  E11.9 250.00 EXERCISE CARDIAC STRESS TEST      CT HEART W/O CONT WITH CALCIUM    Continue treatment labs reviewed      4. Hyperlipidemia, unspecified hyperlipidemia type  E78.5 272.4 EXERCISE CARDIAC STRESS TEST      CT HEART W/O CONT WITH CALCIUM    Continue treatment labs reviewed LDL controlled      1/11/2023  Seen with multiple risk factor new onset chest pain set of regular stress test and coronary calcium score. Already on statin. Consider antiplatelet Plavix as she cannot take aspirin if significant coronary calcification.     Medications Discontinued During This Encounter   Medication Reason    ondansetron (ZOFRAN ODT) 4 mg disintegrating tablet Not A Current Medication       Orders Placed This Encounter    CT HEART W/O CONT WITH CALCIUM     Standing Status:   Future     Standing Expiration Date:   2/11/2024    AMB POC EKG ROUTINE W/ 12 LEADS, INTER & REP     Order Specific Question:   Reason for Exam:     Answer:   htn       Follow-up and Dispositions    Return for Follow-up after test with Ion.

## 2023-01-31 ENCOUNTER — HOSPITAL ENCOUNTER (OUTPATIENT)
Dept: CT IMAGING | Age: 63
Discharge: HOME | End: 2023-01-31
Attending: INTERNAL MEDICINE

## 2023-01-31 DIAGNOSIS — E78.5 HYPERLIPIDEMIA, UNSPECIFIED HYPERLIPIDEMIA TYPE: ICD-10-CM

## 2023-01-31 DIAGNOSIS — I10 PRIMARY HYPERTENSION: ICD-10-CM

## 2023-01-31 DIAGNOSIS — E11.9 TYPE 2 DIABETES MELLITUS WITHOUT COMPLICATION, WITHOUT LONG-TERM CURRENT USE OF INSULIN (HCC): ICD-10-CM

## 2023-01-31 DIAGNOSIS — R07.9 CHEST PAIN, UNSPECIFIED TYPE: ICD-10-CM

## 2023-01-31 PROCEDURE — 75571 CT HRT W/O DYE W/CA TEST: CPT

## 2023-02-22 ENCOUNTER — TELEPHONE (OUTPATIENT)
Age: 63
End: 2023-02-22

## 2023-02-22 ENCOUNTER — OFFICE VISIT (OUTPATIENT)
Age: 63
End: 2023-02-22
Payer: COMMERCIAL

## 2023-02-22 VITALS
BODY MASS INDEX: 36.91 KG/M2 | DIASTOLIC BLOOD PRESSURE: 75 MMHG | HEART RATE: 75 BPM | WEIGHT: 188 LBS | SYSTOLIC BLOOD PRESSURE: 121 MMHG | OXYGEN SATURATION: 97 % | HEIGHT: 60 IN

## 2023-02-22 DIAGNOSIS — I10 ESSENTIAL HYPERTENSION: ICD-10-CM

## 2023-02-22 DIAGNOSIS — E11.69 TYPE 2 DIABETES MELLITUS WITH OTHER SPECIFIED COMPLICATION, WITHOUT LONG-TERM CURRENT USE OF INSULIN (HCC): ICD-10-CM

## 2023-02-22 DIAGNOSIS — R07.9 CHEST PAIN, UNSPECIFIED TYPE: Primary | ICD-10-CM

## 2023-02-22 DIAGNOSIS — E78.2 MIXED HYPERLIPIDEMIA: ICD-10-CM

## 2023-02-22 PROCEDURE — 99214 OFFICE O/P EST MOD 30 MIN: CPT | Performed by: NURSE PRACTITIONER

## 2023-02-22 PROCEDURE — 3078F DIAST BP <80 MM HG: CPT | Performed by: NURSE PRACTITIONER

## 2023-02-22 PROCEDURE — 3074F SYST BP LT 130 MM HG: CPT | Performed by: NURSE PRACTITIONER

## 2023-02-22 ASSESSMENT — ENCOUNTER SYMPTOMS
CHEST TIGHTNESS: 0
NAUSEA: 0
ABDOMINAL PAIN: 0
ABDOMINAL DISTENTION: 0
APNEA: 0
BACK PAIN: 0
WHEEZING: 0
SHORTNESS OF BREATH: 0
COUGH: 0

## 2023-02-22 NOTE — PROGRESS NOTES
HISTORY OF PRESENT ILLNESS    Nenita Vaughan is a 65 y.o. female. 1/11/2023  Patient seen today for new patient evaluation. She is referred here for evaluation of chest pain. She had a mammogram and after that she had bilateral breast pain. Subsequently has started having substernal pressure-like sensation on and off not related to activity exertion no radiation of pain. She has shortness of breath on heavy exertion. No orthopnea PND or peripheral edema. She has a history of hypertension diabetes and hyperlipidemia. No known prior cardiac history    2/2023  Patient seen in follow-up for chest pain and testing results. She denies further episodes of chest pain. She denies shortness of breath palpitations or edema. Chest Pain   This is a recurrent problem. The current episode started more than 1 month ago. The problem has been resolved. The pain is at a severity of 0/10. Pertinent negatives include no abdominal pain, back pain, cough, dizziness, headaches, nausea, palpitations or shortness of breath. Family History   Problem Relation Age of Onset    Cancer Mother        Past Medical History:   Diagnosis Date    Acid reflux     Hypertension        No past surgical history on file. Social History     Tobacco Use    Smoking status: Former    Smokeless tobacco: Never   Substance Use Topics    Alcohol use: Not Currently       Allergies   Allergen Reactions    Azithromycin      Other reaction(s): Unknown (comments)    Codeine      Other reaction(s): Unknown (comments)    Nitrofurantoin      Other reaction(s): Unknown (comments)       Prior to Admission medications    Medication Sig Start Date End Date Taking?  Authorizing Provider   metFORMIN (GLUCOPHAGE) 500 MG tablet Take by mouth 2 times daily (with meals)   Yes Ar Automatic Reconciliation   ondansetron (ZOFRAN-ODT) 4 MG disintegrating tablet Take 4 mg by mouth as needed 3/29/21  Yes Ar Automatic Reconciliation   pantoprazole (PROTONIX) 40 MG tablet Take 40 mg by mouth daily   Yes Ar Automatic Reconciliation   rosuvastatin (CRESTOR) 10 MG tablet Take 10 mg by mouth   Yes Ar Automatic Reconciliation   triamterene-hydroCHLOROthiazide (DYAZIDE) 37.5-25 MG per capsule Take by mouth daily   Yes Ar Automatic Reconciliation         /75 (Site: Right Upper Arm, Position: Sitting, Cuff Size: Medium Adult)   Pulse 75   Ht 5' (1.524 m)   Wt 188 lb (85.3 kg)   SpO2 97%   BMI 36.72 kg/m²         Review of Systems   Constitutional:  Negative for activity change and fatigue. HENT:  Negative for congestion. Eyes:  Negative for visual disturbance. Respiratory:  Negative for apnea, cough, chest tightness, shortness of breath and wheezing. Cardiovascular:  Negative for chest pain, palpitations and leg swelling. Gastrointestinal:  Negative for abdominal distention, abdominal pain and nausea. Genitourinary:  Negative for hematuria. Musculoskeletal:  Negative for arthralgias, back pain and myalgias. Skin:  Negative for pallor. Neurological:  Negative for dizziness, syncope and headaches. Physical Exam  Vitals and nursing note reviewed. Constitutional:       General: She is not in acute distress. Appearance: Normal appearance. She is not ill-appearing. HENT:      Head: Normocephalic and atraumatic. Nose: Nose normal.      Mouth/Throat:      Mouth: Mucous membranes are moist.   Eyes:      Extraocular Movements: Extraocular movements intact. Neck:      Vascular: No carotid bruit. Cardiovascular:      Rate and Rhythm: Normal rate and regular rhythm. Pulses: Normal pulses. Heart sounds: Normal heart sounds. No murmur heard. No friction rub. No gallop. Pulmonary:      Effort: Pulmonary effort is normal. No respiratory distress. Breath sounds: Normal breath sounds. No stridor. No wheezing, rhonchi or rales. Chest:      Chest wall: No tenderness. Abdominal:      General: There is no distension.    Musculoskeletal: General: Normal range of motion. Cervical back: Normal range of motion. Right lower leg: No edema. Left lower leg: No edema. Skin:     General: Skin is warm and dry. Neurological:      General: No focal deficit present. Mental Status: She is alert and oriented to person, place, and time. Psychiatric:         Mood and Affect: Mood normal.     The patient underwent a limited noncontrast CT scan of her chest with cardiac  gating to evaluate for coronary arterial calcification. Using the Agatston  method the coronary calcium score was calculated. There is a large amount of  coronary calcification present primarily in the left anterior descending artery  and to a lesser degree the left circumflex artery and the right coronary artery. Coronary calcium score calculated to be 337. IMPRESSION. Coronary calcium score 337. Coronary calcium score      Exercise stress test  2/14/2023  Interpretation Summary         ECG: Resting ECG demonstrates normal sinus rhythm. ECG: The ECG was negative for ischemia. Stress Test: A William protocol stress test was performed. Overall, the patient's exercise capacity was below average for their age. The patient was stressed for 4 min and 17 sec. Hemodynamics are adequate for diagnosis. Blood pressure demonstrated a normal response and heart rate demonstrated a normal response to stress. The patient reported no symptoms during the stress test.           ASSESSMENT and PLAN    Ms. Vipul Moy has a reminder for a \"due or due soon\" health maintenance. I have asked that she contact her primary care provider for follow-up on this health maintenance. No flowsheet data found. Assessment        Diagnosis Orders   1. Chest pain, unspecified type      Stress test negative for ischemia  Chest pain has resolved      2. Essential hypertension      Blood pressure is controlled continue current medications      3.  Type 2 diabetes mellitus with other specified complication, without long-term current use of insulin ([address]      Continue treatment and follow-up with PCP      4. Mixed hyperlipidemia      Continue treatment labs reviewed LDL controlled           Medications Discontinued During This Encounter   Medication Reason    diazePAM (VALIUM) 2 MG tablet     calcium citrate-vitamin D (CITRACAL+D) 315-5 MG-MCG TABS per tablet    1/11/2023  Seen with multiple risk factor new onset chest pain set of regular stress test and coronary calcium score. Already on statin. Consider antiplatelet Plavix as she cannot take aspirin if significant coronary calcification. 2/2023  Patient seen in follow-up for episode of chest pain which has resolved. Testing reviewed and discussed with patient, exercise stress test negative for ischemia, coronary calcium score 337, blood pressure is controlled continue current medications, LDL is at goal we will continue high intensity statin, patient unable to take aspirin due to history of GI bleed, will consider antiplatelet with Plavix, she prefers we discussed with GI she will contact the office with the name of the GI physicians she has seen in the past.      Follow-up and Dispositions    Return in about 6 months (around 8/22/2023) for Follow up with Dr. Joe Shafer.

## 2023-02-22 NOTE — PROGRESS NOTES
1. Have you been to the ER, urgent care clinic since your last visit? Hospitalized since your last visit?     no    2. Have you seen or consulted any other health care providers outside of the [address] [place] [address] since your last visit? Include any pap smears or colon screening.       Yes pcp

## 2023-08-23 ENCOUNTER — OFFICE VISIT (OUTPATIENT)
Age: 63
End: 2023-08-23
Payer: COMMERCIAL

## 2023-08-23 VITALS
OXYGEN SATURATION: 98 % | SYSTOLIC BLOOD PRESSURE: 122 MMHG | DIASTOLIC BLOOD PRESSURE: 70 MMHG | HEART RATE: 77 BPM | BODY MASS INDEX: 36.52 KG/M2 | HEIGHT: 60 IN | WEIGHT: 186 LBS

## 2023-08-23 DIAGNOSIS — I25.10 CORONARY ARTERY CALCIFICATION: Primary | ICD-10-CM

## 2023-08-23 DIAGNOSIS — E78.2 MIXED HYPERLIPIDEMIA: ICD-10-CM

## 2023-08-23 DIAGNOSIS — I25.84 CORONARY ARTERY CALCIFICATION: Primary | ICD-10-CM

## 2023-08-23 DIAGNOSIS — E11.69 TYPE 2 DIABETES MELLITUS WITH OTHER SPECIFIED COMPLICATION, WITHOUT LONG-TERM CURRENT USE OF INSULIN (HCC): ICD-10-CM

## 2023-08-23 DIAGNOSIS — I10 ESSENTIAL HYPERTENSION: ICD-10-CM

## 2023-08-23 PROCEDURE — 3074F SYST BP LT 130 MM HG: CPT | Performed by: INTERNAL MEDICINE

## 2023-08-23 PROCEDURE — 99214 OFFICE O/P EST MOD 30 MIN: CPT | Performed by: INTERNAL MEDICINE

## 2023-08-23 PROCEDURE — 3078F DIAST BP <80 MM HG: CPT | Performed by: INTERNAL MEDICINE

## 2023-08-23 RX ORDER — PSYLLIUM HUSK 0.4 G
CAPSULE ORAL
COMMUNITY
Start: 2019-01-01

## 2023-08-23 RX ORDER — ALBUTEROL SULFATE 90 UG/1
AEROSOL, METERED RESPIRATORY (INHALATION)
COMMUNITY

## 2023-08-23 RX ORDER — ERGOCALCIFEROL 1.25 MG/1
CAPSULE ORAL
COMMUNITY

## 2023-08-23 ASSESSMENT — ENCOUNTER SYMPTOMS
SHORTNESS OF BREATH: 0
WHEEZING: 0
ABDOMINAL DISTENTION: 0
ABDOMINAL PAIN: 0
BACK PAIN: 0
NAUSEA: 0
CHEST TIGHTNESS: 0
COUGH: 0
APNEA: 0

## 2023-08-23 ASSESSMENT — PATIENT HEALTH QUESTIONNAIRE - PHQ9
SUM OF ALL RESPONSES TO PHQ QUESTIONS 1-9: 0
DEPRESSION UNABLE TO ASSESS: FUNCTIONAL CAPACITY MOTIVATION LIMITS ACCURACY
SUM OF ALL RESPONSES TO PHQ QUESTIONS 1-9: 0
1. LITTLE INTEREST OR PLEASURE IN DOING THINGS: 0
SUM OF ALL RESPONSES TO PHQ QUESTIONS 1-9: 0
2. FEELING DOWN, DEPRESSED OR HOPELESS: 0
SUM OF ALL RESPONSES TO PHQ QUESTIONS 1-9: 0
SUM OF ALL RESPONSES TO PHQ9 QUESTIONS 1 & 2: 0

## 2023-08-23 NOTE — PROGRESS NOTES
1. Have you been to the ER, urgent care clinic since your last visit? Hospitalized since your last visit? No    2. Have you seen or consulted any other health care providers outside of the [address] [place] [address] since your last visit? Include any pap smears or colon screening.       No
maintenance. I have asked that she contact her primary care provider for follow-up on this health maintenance. No flowsheet data found. Assessment        Diagnosis Orders   1. Coronary artery calcification      Moderate calcification. Symptoms stable continue current medical management monitor      2. Essential hypertension      Stable continue treatment monitor      3. Mixed hyperlipidemia      Continue current treatment monitor lab with PCP      4. Type 2 diabetes mellitus with other specified complication, without long-term current use of insulin ([address])      Continue current treatment. Monitor lab with PCP stable          There are no discontinued medications. 1/11/2023  Seen with multiple risk factor new onset chest pain set of regular stress test and coronary calcium score. Already on statin. Consider antiplatelet Plavix as she cannot take aspirin if significant coronary calcification. 2/2023  Patient seen in follow-up for episode of chest pain which has resolved. Testing reviewed and discussed with patient, exercise stress test negative for ischemia, coronary calcium score 337, blood pressure is controlled continue current medications, LDL is at goal we will continue high intensity statin, patient unable to take aspirin due to history of GI bleed, will consider antiplatelet with Plavix, she prefers we discussed with GI she will contact the office with the name of the GI physicians she has seen in the past.  8/2023  Cardiac status stable. Symptoms overall stable continue current medical management. Not on antiplatelet agent due to prior GI bleed. At present does not want to start any. Continue statin. Lab with PCP    Follow-up and Dispositions    Return in about 6 months (around 2/23/2024).